# Patient Record
Sex: MALE | Race: WHITE | URBAN - METROPOLITAN AREA
[De-identification: names, ages, dates, MRNs, and addresses within clinical notes are randomized per-mention and may not be internally consistent; named-entity substitution may affect disease eponyms.]

---

## 2017-01-16 ENCOUNTER — TELEPHONE (OUTPATIENT)
Dept: OPHTHALMOLOGY | Facility: CLINIC | Age: 27
End: 2017-01-16

## 2017-01-16 NOTE — TELEPHONE ENCOUNTER
pt called, had to cancel todays appt with [facility] [place]. He would like to reschedule for contact lens check.   Thank you

## 2017-12-06 ENCOUNTER — TELEPHONE (OUTPATIENT)
Dept: OPHTHALMOLOGY | Facility: CLINIC | Age: 27
End: 2017-12-06

## 2017-12-06 NOTE — TELEPHONE ENCOUNTER
Spoke to pt and states that he will call me back with a fax number to have contacts faxed so he can get contacts up until his next apt in Feb.

## 2017-12-06 NOTE — TELEPHONE ENCOUNTER
Patient states has ran out of contacts. Set up appt for next available on 02/02/18. Would like to know if [facility] can refill contact Rx until his next appt. Please call. Thank you.

## 2018-02-16 ENCOUNTER — OFFICE VISIT (OUTPATIENT)
Dept: OPHTHALMOLOGY | Facility: CLINIC | Age: 28
End: 2018-02-16

## 2018-02-16 DIAGNOSIS — H52.203 MYOPIA OF BOTH EYES WITH ASTIGMATISM: ICD-10-CM

## 2018-02-16 DIAGNOSIS — H01.00A BLEPHARITIS OF UPPER AND LOWER EYELIDS OF BOTH EYES, UNSPECIFIED TYPE: ICD-10-CM

## 2018-02-16 DIAGNOSIS — H50.52 EXOPHORIA: Primary | ICD-10-CM

## 2018-02-16 DIAGNOSIS — H01.00B BLEPHARITIS OF UPPER AND LOWER EYELIDS OF BOTH EYES, UNSPECIFIED TYPE: ICD-10-CM

## 2018-02-16 DIAGNOSIS — H52.13 MYOPIA OF BOTH EYES WITH ASTIGMATISM: ICD-10-CM

## 2018-02-16 PROBLEM — H50.10 EXOTROPIA: Status: ACTIVE | Noted: 2018-02-16

## 2018-02-16 PROBLEM — H50.10 EXOTROPIA: Status: RESOLVED | Noted: 2018-02-16 | Resolved: 2018-02-16

## 2018-02-16 PROCEDURE — 92015 DETERMINE REFRACTIVE STATE: CPT | Performed by: OPHTHALMOLOGY

## 2018-02-16 PROCEDURE — 92014 COMPRE OPH EXAM EST PT 1/>: CPT | Performed by: OPHTHALMOLOGY

## 2018-02-16 NOTE — PROGRESS NOTES
Obed Lund is a 24year old male. HPI:     HPI     EP/ 25 yo M here for complete. LDE: 11/14/16  He has history of blepharitis, myopia and astigmatism OU. He needs new glasses and contacts.   Patient would like an updated glasses and contact RX- he fingers)       Left Right     Full Full          Extraocular Movement       Right Left     Full Full          Dilation     Both eyes:  1.0% Mydriacyl and 2.5% Juan Synephrine @ 11:27 AM            Additional Tests     Stereo     Fly:  +    Animals:  3/3 instructed to use lid hygiene once daily. Apply baby shampoo to warm washcloth and scrub eyelids gently with eyes closed, then rinse thoroughly. No orders of the defined types were placed in this encounter.       Meds This Visit:    No prescriptio

## 2018-02-16 NOTE — PATIENT INSTRUCTIONS
Exotropia  Will continue to watch. Myopia of both eyes with astigmatism  New glasses and CLs Rx given.

## 2018-03-13 ENCOUNTER — TELEPHONE (OUTPATIENT)
Dept: OPHTHALMOLOGY | Facility: CLINIC | Age: 28
End: 2018-03-13

## 2018-03-13 NOTE — TELEPHONE ENCOUNTER
Patient states 1800 contacts do not carry the contact lense for his left eye for astigmatism. Would like to know his other options. Please call. Thank you.

## 2019-05-10 ENCOUNTER — OFFICE VISIT (OUTPATIENT)
Dept: OPHTHALMOLOGY | Facility: CLINIC | Age: 29
End: 2019-05-10
Payer: COMMERCIAL

## 2019-05-10 DIAGNOSIS — H50.52 EXOPHORIA: Primary | ICD-10-CM

## 2019-05-10 DIAGNOSIS — H52.13 MYOPIA OF BOTH EYES WITH ASTIGMATISM: ICD-10-CM

## 2019-05-10 DIAGNOSIS — H52.203 MYOPIA OF BOTH EYES WITH ASTIGMATISM: ICD-10-CM

## 2019-05-10 DIAGNOSIS — H01.00B BLEPHARITIS OF UPPER AND LOWER EYELIDS OF BOTH EYES, UNSPECIFIED TYPE: ICD-10-CM

## 2019-05-10 DIAGNOSIS — H01.00A BLEPHARITIS OF UPPER AND LOWER EYELIDS OF BOTH EYES, UNSPECIFIED TYPE: ICD-10-CM

## 2019-05-10 PROCEDURE — 92015 DETERMINE REFRACTIVE STATE: CPT | Performed by: OPHTHALMOLOGY

## 2019-05-10 PROCEDURE — 92014 COMPRE OPH EXAM EST PT 1/>: CPT | Performed by: OPHTHALMOLOGY

## 2019-05-10 NOTE — PROGRESS NOTES
Iris Smith is a 34year old male. HPI:     HPI     EP/ 34 yr old here for a complete exam. LDE 2/16/18 with Hx of myopia, astigmatism, exophoria and blepharitis. Pt would like an updated Rx for both contacts and glasses.  Pt states that he has blurre Tonometry (Applanation, 10:01 AM)       Right Left    Pressure 16 16          Dilation     Both eyes:  1.0% Mydriacyl and 2.5% Juan Synephrine @ 10:01 AM            Slit Lamp and Fundus Exam     External Exam       Right Left    External Normal Nor This Visit:  Requested Prescriptions      No prescriptions requested or ordered in this encounter        Follow up instructions:  Return in about 2 years (around 5/10/2021), or if symptoms worsen or fail to improve, for Dilated Eye Exam.    5/10/2019  Scri

## 2020-02-13 ENCOUNTER — TELEPHONE (OUTPATIENT)
Dept: OPHTHALMOLOGY | Facility: CLINIC | Age: 30
End: 2020-02-13

## 2020-02-13 NOTE — TELEPHONE ENCOUNTER
Per pt needs copy of glasses and contacts rx, please mail to home, address in [facility] confirmed.

## 2020-08-03 ENCOUNTER — OFFICE VISIT (OUTPATIENT)
Dept: OPHTHALMOLOGY | Facility: CLINIC | Age: 30
End: 2020-08-03
Payer: COMMERCIAL

## 2020-08-03 DIAGNOSIS — H52.203 MYOPIA OF BOTH EYES WITH ASTIGMATISM: ICD-10-CM

## 2020-08-03 DIAGNOSIS — H52.13 MYOPIA OF BOTH EYES WITH ASTIGMATISM: ICD-10-CM

## 2020-08-03 DIAGNOSIS — H01.006 BLEPHARITIS OF BOTH EYES, UNSPECIFIED EYELID, UNSPECIFIED TYPE: Primary | ICD-10-CM

## 2020-08-03 DIAGNOSIS — H01.003 BLEPHARITIS OF BOTH EYES, UNSPECIFIED EYELID, UNSPECIFIED TYPE: Primary | ICD-10-CM

## 2020-08-03 PROCEDURE — 92014 COMPRE OPH EXAM EST PT 1/>: CPT | Performed by: OPHTHALMOLOGY

## 2020-08-03 PROCEDURE — 92015 DETERMINE REFRACTIVE STATE: CPT | Performed by: OPHTHALMOLOGY

## 2020-08-03 NOTE — PROGRESS NOTES
Jasper Edouard is a 31year old male. HPI:     HPI     EP/ 31year old here for a complete exam. LDE 5/10/19 with history of myopia with astigmatism OU and exophoria.  Pt states vision is stable, he prefers to wear his glasses but does wear his CLs occas Dilation     Both eyes:  1.0% Mydriacyl and 2.5% Juan Synephrine @ 9:55 AM            Slit Lamp and Fundus Exam     External Exam       Right Left    External Normal Normal          Slit Lamp Exam       Right Left    Lids/Lashes Blepharitis Blephar the defined types were placed in this encounter.       Meds This Visit:  Requested Prescriptions      No prescriptions requested or ordered in this encounter        Follow up instructions:  Return in about 1 year (around 8/3/2021), or if symptoms worsen or

## 2020-08-03 NOTE — PATIENT INSTRUCTIONS
Myopia of both eyes with astigmatism  New glasses and contacts    Blepharitis  Patient instructed to use lid hygiene daily. Apply baby shampoo to warm washcloth and scrub eyelids gently with eyes closed, then rinse thoroughly.

## 2021-07-19 ENCOUNTER — TELEPHONE (OUTPATIENT)
Dept: OPHTHALMOLOGY | Facility: CLINIC | Age: 31
End: 2021-07-19

## 2021-07-19 NOTE — TELEPHONE ENCOUNTER
Patient is requesting prescription to  in office for both contacts and glasses. Patient last visit on 8/3/2020. Patient is getting  in Sept and requesting script, declined scheduling at this time. Please call at [phone].

## 2023-12-11 ENCOUNTER — HOSPITAL ENCOUNTER (OUTPATIENT)
Dept: GENERAL RADIOLOGY | Age: 33
Discharge: HOME | End: 2023-12-11
Attending: EMERGENCY MEDICINE

## 2023-12-11 ENCOUNTER — WALK IN (OUTPATIENT)
Dept: URGENT CARE | Age: 33
End: 2023-12-11

## 2023-12-11 VITALS
WEIGHT: 190 LBS | OXYGEN SATURATION: 97 % | RESPIRATION RATE: 16 BRPM | DIASTOLIC BLOOD PRESSURE: 75 MMHG | HEART RATE: 84 BPM | BODY MASS INDEX: 24.39 KG/M2 | TEMPERATURE: 97.9 F | SYSTOLIC BLOOD PRESSURE: 126 MMHG

## 2023-12-11 DIAGNOSIS — M54.50 ACUTE BILATERAL LOW BACK PAIN WITHOUT SCIATICA: ICD-10-CM

## 2023-12-11 DIAGNOSIS — M54.50 ACUTE BILATERAL LOW BACK PAIN WITHOUT SCIATICA: Primary | ICD-10-CM

## 2023-12-11 PROCEDURE — 72110 X-RAY EXAM L-2 SPINE 4/>VWS: CPT

## 2023-12-11 RX ORDER — NAPROXEN 500 MG/1
500 TABLET ORAL 2 TIMES DAILY WITH MEALS
Qty: 20 TABLET | Refills: 0 | Status: SHIPPED | OUTPATIENT
Start: 2023-12-11 | End: 2023-12-21

## 2023-12-11 RX ORDER — CYCLOBENZAPRINE HCL 5 MG
5 TABLET ORAL 3 TIMES DAILY PRN
Qty: 21 TABLET | Refills: 0 | Status: SHIPPED | OUTPATIENT
Start: 2023-12-11 | End: 2023-12-18

## 2023-12-11 RX ORDER — LIDOCAINE 50 MG/G
1 PATCH TOPICAL
Qty: 10 PATCH | Refills: 0 | Status: SHIPPED | OUTPATIENT
Start: 2023-12-11

## 2023-12-11 ASSESSMENT — PAIN SCALES - GENERAL: PAINLEVEL: 0

## 2023-12-14 PROBLEM — G89.29 CHRONIC BILATERAL LOW BACK PAIN WITHOUT SCIATICA: Status: ACTIVE | Noted: 2023-12-14

## 2023-12-14 PROBLEM — M54.50 CHRONIC BILATERAL LOW BACK PAIN WITHOUT SCIATICA: Status: ACTIVE | Noted: 2023-12-14

## 2024-02-14 ENCOUNTER — IMAGING SERVICES (OUTPATIENT)
Dept: MRI IMAGING | Age: 34
End: 2024-02-14

## 2024-02-14 DIAGNOSIS — M54.50 CHRONIC BILATERAL LOW BACK PAIN WITHOUT SCIATICA: ICD-10-CM

## 2024-02-14 DIAGNOSIS — G89.29 CHRONIC BILATERAL LOW BACK PAIN WITHOUT SCIATICA: ICD-10-CM

## 2024-02-14 PROCEDURE — 72148 MRI LUMBAR SPINE W/O DYE: CPT | Performed by: INTERNAL MEDICINE

## 2024-05-13 ENCOUNTER — OFFICE VISIT (OUTPATIENT)
Dept: OPHTHALMOLOGY | Facility: CLINIC | Age: 34
End: 2024-05-13

## 2024-05-13 DIAGNOSIS — H01.026 SQUAMOUS BLEPHARITIS OF BOTH EYES, UNSPECIFIED EYELID: Primary | ICD-10-CM

## 2024-05-13 DIAGNOSIS — H52.13 MYOPIA OF BOTH EYES WITH ASTIGMATISM: ICD-10-CM

## 2024-05-13 DIAGNOSIS — H52.203 MYOPIA OF BOTH EYES WITH ASTIGMATISM: ICD-10-CM

## 2024-05-13 DIAGNOSIS — H01.023 SQUAMOUS BLEPHARITIS OF BOTH EYES, UNSPECIFIED EYELID: Primary | ICD-10-CM

## 2024-05-13 PROCEDURE — 92004 COMPRE OPH EXAM NEW PT 1/>: CPT | Performed by: OPHTHALMOLOGY

## 2024-05-13 PROCEDURE — 92015 DETERMINE REFRACTIVE STATE: CPT | Performed by: OPHTHALMOLOGY

## 2024-05-14 PROBLEM — H50.52 EXOPHORIA: Status: RESOLVED | Noted: 2018-02-16 | Resolved: 2024-05-14

## 2024-05-14 NOTE — PROGRESS NOTES
Fox Payton is a 34 year old male.    HPI:     HPI    NP/34 yrs, here for a complete eye exam and new glasses + CL Rx   LDE and was last seen on 08/03/2020 (Was prescribed CL and glasses)   H/O Myopia of both eyes with astigmatism.   Blepharitis both eyes.     C/O not able to see far (not able to read the street signs) and glare is bothersome at night times even with CL or glasses on, for about a year but more noticeable since 1 month.   Current pair of glasses 2 years old but the glasses Rx is 4 years old.   Mostly prefers glasses over CL, but wears CL occasionally.       Last edited by Venus Pearl OT on 5/13/2024  8:54 AM.        Patient History:  Past Medical History:    Attention deficit disorder without mention of hyperactivity    Blepharitis    Myopia of both eyes with astigmatism    Myopia with astigmatism    OU    Subjective visual disturbance of both eyes       Surgical History: Fox Payton has no past surgical history on file.    Family History   Problem Relation Age of Onset    Diabetes Paternal Grandfather     Blindness Paternal Grandfather         unknown cause    Glaucoma Neg         Per NG multiple    Macular degeneration Neg         Per NG multiple       Social History:   Social History     Socioeconomic History    Marital status: Unknown   Tobacco Use    Smoking status: Never    Smokeless tobacco: Never   Substance and Sexual Activity    Alcohol use: Yes    Drug use: No       Medications:  Current Outpatient Medications   Medication Sig Dispense Refill    amphetamine-dextroamphetamine (ADDERALL) 20 MG Oral Tab Take 20 mg by mouth daily. Indications: prn      LORazepam (ATIVAN) 1 MG Oral Tab Take 1 mg by mouth every 4 (four) hours as needed for Anxiety.         Allergies:  No Known Allergies    ROS:       PHYSICAL EXAM:     Base Eye Exam       Visual Acuity (Snellen - Linear)         Right Left    Dist cc 20/20 20/25    Dist ph cc  20/20    Near cc 20/20 20/20      Correction: Glasses               Tonometry (Applanation, 9:00 AM)         Right Left    Pressure 19 17              Tonometry #2 (Applanation, 9:42 AM)         Right Left    Pressure 18 17              Tonometry Comments    T2 post dilation              Pupils         Pupils APD    Right PERRL None    Left PERRL None              Visual Fields (Counting fingers)         Left Right     Full Full              Extraocular Movement         Right Left     Full, Ortho Full, Ortho              Dilation       Both eyes: 1.0% Mydriacyl and 2.5% Juan Synephrine @ 9:01 AM                  Slit Lamp and Fundus Exam       External Exam         Right Left    External Normal Normal              Slit Lamp Exam         Right Left    Lids/Lashes Blepharitis Blepharitis    Conjunctiva/Sclera Normal Normal    Cornea Clear Clear    Anterior Chamber Deep and quiet Deep and quiet    Iris Normal Normal    Lens Clear Clear    Vitreous Clear Clear              Fundus Exam         Right Left    Disc Normal Normal    C/D Ratio 0.3 0.3    Macula Normal Normal    Vessels Normal Normal    Periphery Normal Normal                  Refraction       Wearing Rx         Sphere Cylinder Axis    Right -2.25 +1.00 085    Left -2.25 +1.25 100      Age: 4yrs    Type: Single vision              Cycloplegic Refraction (Auto)         Sphere Cylinder [place]    Right -2.25 +1.00 065     Left -2.25 +1.00 095               Cycloplegic Refraction #2         Sphere Cylinder [place]    Right -2.25 +1.00 065 20/20    Left -2.50 +1.00 100 20/20              Cycloplegic Refraction #3         Sphere Cylinder [place]    Right -2.50 +1.25 070 20/20    Left -2.50 +1.00 100 20/20              Cycloplegic Refraction Comments    C3 Dr. Mariano             Final Rx         Sphere Cylinder [place] [facility] [place]    Right -2.50 +1.25 070 20/20    Left -2.50 +1.00 100 20/20      Type: Single vision                  Contact Lens Exam       Current Contact Lens Rx         Brand Base Curve Diameter  Sphere Cylinder Axis    Right [employer] Aqua Comfort Plus 8.80 14.4 -1.25 -0.75 170    Left [employer] Aqua Comfort Plus 8.80 14.4 -1.00 -1.25 010              Final Contact Lens Rx         Brand Base Curve Diameter Sphere Cylinder Axis    Right [employer] Aqua Comfort Plus 8.80 14.4 -1.25 -1.25 160    Left [employer] Aqua Comfort Plus 8.80 14.4 -1.50 -0.75 010      Expiration Date: 5/13/2026                     ASSESSMENT/PLAN:     Diagnoses and Plan:     Myopia of both eyes with astigmatism  New glasses and contacts.    Blepharitis  Patient instructed to use lid hygiene daily.  Apply baby shampoo or Ousoft to warm washcloth and cleanse eyelids gently with eyes closed, then rinse thoroughly.        No orders of the defined types were placed in this encounter.      Meds This Visit:  Requested Prescriptions      No prescriptions requested or ordered in this encounter        Follow up instructions:  No follow-ups on file.    5/14/2024  Scribed by: Lorena Mariano MD

## 2024-05-14 NOTE — PATIENT INSTRUCTIONS
Myopia of both eyes with astigmatism  New glasses and contacts.    Blepharitis  Patient instructed to use lid hygiene daily.  Apply baby shampoo or Ousoft to warm washcloth and cleanse eyelids gently with eyes closed, then rinse thoroughly.

## 2024-05-14 NOTE — ASSESSMENT & PLAN NOTE
Patient instructed to use lid hygiene daily.  Apply baby shampoo or Ousoft to warm washcloth and cleanse eyelids gently with eyes closed, then rinse thoroughly.

## 2024-08-20 ENCOUNTER — PATIENT MESSAGE (OUTPATIENT)
Dept: OPHTHALMOLOGY | Facility: CLINIC | Age: 34
End: 2024-08-20

## 2025-04-03 ENCOUNTER — HOSPITAL ENCOUNTER (OUTPATIENT)
Dept: GENERAL RADIOLOGY | Age: 35
Discharge: HOME | End: 2025-04-03
Attending: EMERGENCY MEDICINE

## 2025-04-03 ENCOUNTER — WALK IN (OUTPATIENT)
Dept: URGENT CARE | Age: 35
End: 2025-04-03
Attending: EMERGENCY MEDICINE

## 2025-04-03 VITALS
OXYGEN SATURATION: 100 % | RESPIRATION RATE: 20 BRPM | HEART RATE: 86 BPM | TEMPERATURE: 97.6 F | SYSTOLIC BLOOD PRESSURE: 103 MMHG | DIASTOLIC BLOOD PRESSURE: 67 MMHG

## 2025-04-03 DIAGNOSIS — J10.1 INFLUENZA B: Primary | ICD-10-CM

## 2025-04-03 DIAGNOSIS — J10.1 INFLUENZA B: ICD-10-CM

## 2025-04-03 DIAGNOSIS — J02.9 SORE THROAT: ICD-10-CM

## 2025-04-03 DIAGNOSIS — J18.9 PNEUMONIA OF BOTH LOWER LOBES DUE TO INFECTIOUS ORGANISM: ICD-10-CM

## 2025-04-03 LAB
S PYO DNA THROAT QL NAA+PROBE: NOT DETECTED
TEST LOT EXPIRATION DATE: NORMAL
TEST LOT NUMBER: NORMAL

## 2025-04-03 PROCEDURE — 71046 X-RAY EXAM CHEST 2 VIEWS: CPT

## 2025-04-03 PROCEDURE — 87651 STREP A DNA AMP PROBE: CPT | Performed by: EMERGENCY MEDICINE

## 2025-04-03 RX ORDER — ALBUTEROL SULFATE 90 UG/1
2 INHALANT RESPIRATORY (INHALATION) EVERY 4 HOURS PRN
Qty: 1 EACH | Refills: 0 | Status: SHIPPED | OUTPATIENT
Start: 2025-04-03 | End: 2025-04-10

## 2025-04-03 RX ORDER — DOXYCYCLINE HYCLATE 100 MG
100 TABLET ORAL 2 TIMES DAILY
Qty: 20 TABLET | Refills: 0 | Status: SHIPPED | OUTPATIENT
Start: 2025-04-03 | End: 2025-04-13

## 2025-04-03 RX ORDER — DEXTROMETHORPHAN HYDROBROMIDE AND PROMETHAZINE HYDROCHLORIDE 15; 6.25 MG/5ML; MG/5ML
5 SYRUP ORAL 4 TIMES DAILY PRN
Qty: 120 ML | Refills: 1 | Status: SHIPPED | OUTPATIENT
Start: 2025-04-03

## 2025-04-03 RX ORDER — CEFUROXIME AXETIL 500 MG/1
500 TABLET ORAL 2 TIMES DAILY
Qty: 20 TABLET | Refills: 0 | Status: SHIPPED | OUTPATIENT
Start: 2025-04-03 | End: 2025-04-13

## 2025-04-03 ASSESSMENT — PAIN SCALES - GENERAL
PAINLEVEL_OUTOF10: 0
PAINLEVEL: 0